# Patient Record
Sex: FEMALE | Race: WHITE | NOT HISPANIC OR LATINO | URBAN - METROPOLITAN AREA
[De-identification: names, ages, dates, MRNs, and addresses within clinical notes are randomized per-mention and may not be internally consistent; named-entity substitution may affect disease eponyms.]

---

## 2022-07-06 ENCOUNTER — WEB ENCOUNTER (OUTPATIENT)
Dept: URBAN - METROPOLITAN AREA CLINIC 33 | Facility: CLINIC | Age: 62
End: 2022-07-06

## 2022-07-12 ENCOUNTER — TELEPHONE ENCOUNTER (OUTPATIENT)
Dept: URBAN - METROPOLITAN AREA CLINIC 35 | Facility: CLINIC | Age: 62
End: 2022-07-12

## 2022-07-12 ENCOUNTER — DASHBOARD ENCOUNTERS (OUTPATIENT)
Age: 62
End: 2022-07-12

## 2022-07-12 ENCOUNTER — OFFICE VISIT (OUTPATIENT)
Dept: URBAN - METROPOLITAN AREA CLINIC 33 | Facility: CLINIC | Age: 62
End: 2022-07-12
Payer: COMMERCIAL

## 2022-07-12 VITALS
DIASTOLIC BLOOD PRESSURE: 63 MMHG | OXYGEN SATURATION: 93 % | HEART RATE: 75 BPM | HEIGHT: 66 IN | SYSTOLIC BLOOD PRESSURE: 132 MMHG | WEIGHT: 205 LBS | BODY MASS INDEX: 32.95 KG/M2

## 2022-07-12 DIAGNOSIS — Z12.11 COLON CANCER SCREENING: ICD-10-CM

## 2022-07-12 DIAGNOSIS — Z80.0 FAMILY HISTORY OF COLON CANCER: ICD-10-CM

## 2022-07-12 DIAGNOSIS — K59.01 CONSTIPATION BY DELAYED COLONIC TRANSIT: ICD-10-CM

## 2022-07-12 PROCEDURE — 99204 OFFICE O/P NEW MOD 45 MIN: CPT | Performed by: PHYSICIAN ASSISTANT

## 2022-07-12 RX ORDER — VALSARTAN 320 MG/1
1 TABLET TABLET, FILM COATED ORAL ONCE A DAY
Status: ACTIVE | COMMUNITY

## 2022-07-12 RX ORDER — ASPIRIN 81 MG/1
1 TABLET TABLET, COATED ORAL AS NEEDED
Status: DISCONTINUED | COMMUNITY

## 2022-07-12 RX ORDER — VALSARTAN 320 MG/1
1 TABLET TABLET ORAL ONCE A DAY
Status: DISCONTINUED | COMMUNITY

## 2022-07-12 RX ORDER — GEMFIBROZIL 600 MG/1
1 TABLET TABLET, FILM COATED ORAL TWICE A DAY
Status: ACTIVE | COMMUNITY

## 2022-07-12 RX ORDER — DULOXETINE HYDROCHLORIDE 30 MG/1
3 CAPSULE CAPSULE, DELAYED RELEASE ORAL ONCE A DAY
Status: ACTIVE | COMMUNITY

## 2022-07-12 RX ORDER — ACETAMINOPHEN,PHENIRAMINE MALEATE, PHENYLEPHRINE HYDROCHLORIDE 650; 20; 10 MG/15000MG; MG/15000MG; MG/15000MG
150 ML POWDER, FOR SUSPENSION ORAL TWICE A DAY
Qty: 9000 | OUTPATIENT
Start: 2022-07-12 | End: 2022-08-11

## 2022-07-12 RX ORDER — AMLODIPINE BESYLATE 10 MG/1
1 TABLET TABLET ORAL ONCE A DAY
Status: ACTIVE | COMMUNITY

## 2022-07-12 RX ORDER — SODIUM, POTASSIUM,MAG SULFATES 17.5-3.13G
177ML SOLUTION, RECONSTITUTED, ORAL ORAL
Qty: 1 | Refills: 0 | OUTPATIENT
Start: 2022-07-12 | End: 2022-07-14

## 2022-07-12 NOTE — PHYSICAL EXAM CONSTITUTIONAL:
normal, alert, in no acute distress, well developed, well nourished, ambulating without difficulty, normal communication ability
None

## 2022-07-12 NOTE — HPI-COLONOSCOPY SCREENING
61 Year old female patient presents today for a colorectal cancer screening consultation. She admits having a colonoscopy with Dr. Cramer 2015, Patient denies having a sigmoidoscopy before. Currently admits 0-1 bowel movements per day with normal and hard stools.  She will have a BM every one to three days. Denies melena in stool. Patient admits blood and mucous sometimes, patient states that she sees blood in her stools when her bowel movements are very hard.  Prior colonoscopy 2015 with a hyperplastic polyp removed

## 2022-07-12 NOTE — HPI-CONSTIPATION
Patient admits history (2.5 years ago when she stopped smoking) of constipation.  She states she has been moving less since COVID, and also isn't drinking as much coffee as she was.  She notes no aggravating factors and patient states that she eates yogurt and eats more fiber, and she states that it helps a little bit but not consistently.  Patient denies associated abdominal pains, bloating, and gas. When patient has a bowel movement she sometimees feels like she is not completely emptying out her bowels. Denies fecal incontinence. Patient denies recently drinking lake or well water.

## 2022-07-18 PROBLEM — 35298007: Status: ACTIVE | Noted: 2022-07-12

## 2022-08-29 ENCOUNTER — OFFICE VISIT (OUTPATIENT)
Dept: URBAN - METROPOLITAN AREA SURGERY CENTER 8 | Facility: SURGERY CENTER | Age: 62
End: 2022-08-29

## 2022-09-20 ENCOUNTER — OFFICE VISIT (OUTPATIENT)
Dept: URBAN - METROPOLITAN AREA CLINIC 33 | Facility: CLINIC | Age: 62
End: 2022-09-20

## 2024-12-17 ENCOUNTER — RX ONLY (RX ONLY)
Age: 64
End: 2024-12-17

## 2024-12-17 RX ORDER — PERMETHRIN 50 MG/G
CREAM TOPICAL
Qty: 60 | Refills: 2 | Status: ERX | COMMUNITY
Start: 2024-12-17

## 2025-03-25 ENCOUNTER — OFFICE VISIT (OUTPATIENT)
Dept: URBAN - METROPOLITAN AREA CLINIC 31 | Facility: CLINIC | Age: 65
End: 2025-03-25

## 2025-03-25 RX ORDER — METOPROLOL SUCCINATE 100 MG/1
1 TABLET TABLET, EXTENDED RELEASE ORAL ONCE A DAY
Status: ACTIVE | COMMUNITY

## 2025-03-25 RX ORDER — DULOXETINE 30 MG/1
1 CAPSULE CAPSULE, DELAYED RELEASE PELLETS ORAL
Status: ACTIVE | COMMUNITY

## 2025-03-25 RX ORDER — TRAZODONE HYDROCHLORIDE 150 MG/1
1 TABLET AT BEDTIME TABLET ORAL ONCE A DAY
Status: ACTIVE | COMMUNITY

## 2025-03-25 RX ORDER — GEMFIBROZIL 600 MG/1
1 TABLET TABLET, FILM COATED ORAL TWICE A DAY
Status: ACTIVE | COMMUNITY

## 2025-03-25 RX ORDER — AMLODIPINE AND VALSARTAN 10; 320 MG/1; MG/1
1 TABLET TABLET, FILM COATED ORAL ONCE A DAY
Status: ACTIVE | COMMUNITY

## 2025-03-25 NOTE — HPI-PERSONAL HISTORY OF COLON POLYPS
64-Year-old female patient presents today for a personal history polyps. She admits having a colonoscopy with Dr. Cramer 2015, Patient denies having a sigmoidoscopy before. Currently, admits 1 bowel movement every 2-3 days per week, without strain. Her stools are currently normal (rare hard stools). Sometimes she has mucus in her stool. Denies melena in stool. Patient has added in kayla seeds to help facilitate bowel movements.   Prior colonoscopy 2015 with a hyperplastic polyp removed.  (Last Visit 07/12/2022) 61-Year-old female patient presents today for a colorectal cancer screening consultation. She admits having a colonoscopy with Dr. Cramer 2015, Patient denies having a sigmoidoscopy before. Currently, admits 0-1 bowel movements per day with normal and hard stools. She will have a BM every one to three days. Denies melena in stool. Patient admits blood and mucous sometimes, patient states that she sees blood in her stools when her bowel movements are very hard.  Prior colonoscopy 2015 with a hyperplastic polyp removed.

## 2025-03-25 NOTE — PHYSICAL EXAM HENT:
Head, normocephalic, atraumatic, Face, Face within normal limits, Ears, External ears within normal limits. MC -II

## 2025-04-01 ENCOUNTER — LAB OUTSIDE AN ENCOUNTER (OUTPATIENT)
Dept: URBAN - METROPOLITAN AREA CLINIC 31 | Facility: CLINIC | Age: 65
End: 2025-04-01

## 2025-04-23 ENCOUNTER — OFFICE VISIT (OUTPATIENT)
Dept: URBAN - METROPOLITAN AREA SURGERY CENTER 8 | Facility: SURGERY CENTER | Age: 65
End: 2025-04-23

## 2025-04-25 ENCOUNTER — TELEPHONE ENCOUNTER (OUTPATIENT)
Dept: URBAN - METROPOLITAN AREA SURGERY CENTER 8 | Facility: SURGERY CENTER | Age: 65
End: 2025-04-25

## 2025-04-30 ENCOUNTER — OFFICE VISIT (OUTPATIENT)
Dept: URBAN - METROPOLITAN AREA SURGERY CENTER 8 | Facility: SURGERY CENTER | Age: 65
End: 2025-04-30

## 2025-04-30 RX ORDER — TRAZODONE HYDROCHLORIDE 150 MG/1
1 TABLET AT BEDTIME TABLET ORAL ONCE A DAY
Status: ACTIVE | COMMUNITY

## 2025-04-30 RX ORDER — GEMFIBROZIL 600 MG/1
1 TABLET TABLET, FILM COATED ORAL TWICE A DAY
Status: ACTIVE | COMMUNITY

## 2025-04-30 RX ORDER — DULOXETINE 30 MG/1
1 CAPSULE CAPSULE, DELAYED RELEASE PELLETS ORAL
Status: ACTIVE | COMMUNITY

## 2025-04-30 RX ORDER — METOPROLOL SUCCINATE 100 MG/1
1 TABLET TABLET, EXTENDED RELEASE ORAL ONCE A DAY
Status: ACTIVE | COMMUNITY

## 2025-04-30 RX ORDER — AMLODIPINE AND VALSARTAN 10; 320 MG/1; MG/1
1 TABLET TABLET, FILM COATED ORAL ONCE A DAY
Status: ACTIVE | COMMUNITY

## 2025-05-14 ENCOUNTER — OFFICE VISIT (OUTPATIENT)
Dept: URBAN - METROPOLITAN AREA CLINIC 33 | Facility: CLINIC | Age: 65
End: 2025-05-14